# Patient Record
(demographics unavailable — no encounter records)

---

## 2025-02-04 NOTE — REVIEW OF SYSTEMS
[Fatigue] : no fatigue [Decreased Appetite] : appetite not decreased [Recent Weight Gain (___ Lbs)] : no recent weight gain [Recent Weight Loss (___ Lbs)] : no recent weight loss [Visual Field Defect] : no visual field defect [Dry Eyes] : no dryness [Dysphagia] : no dysphagia [Dysphonia] : no dysphonia [Chest Pain] : no chest pain [Palpitations] : no palpitations [Shortness Of Breath] : no shortness of breath [Cough] : no cough [Nausea] : no nausea [Constipation] : no constipation [Vomiting] : no vomiting [Diarrhea] : no diarrhea [Polyuria] : no polyuria [Irregular Menses] : regular menses [Joint Pain] : no joint pain [Muscle Weakness] : no muscle weakness [Acanthosis] : no acanthosis  [Acne] : no acne [Headaches] : no headaches [Tremors] : no tremors [Depression] : no depression [Polydipsia] : no polydipsia [Cold Intolerance] : no cold intolerance [Easy Bleeding] : no ~M tendency for easy bleeding [Easy Bruising] : no tendency for easy bruising

## 2025-02-04 NOTE — HISTORY OF PRESENT ILLNESS
[FreeTextEntry1] : 31 year old female here for evaluation of hypothyroidism.  She is accompanied by her    Most recent TFTs showing:  TSH: 0.656 FT4: 1.29  However, reported that earlier this year her TSH was <0.01  Reported history of thyroid nodules, no previous FNA   Symptoms:  +Increased cold intolerance  Increased hair loss  Increased constipation  She does have Plans for pregnancy   She reported that she has been having increased abdominal pain.

## 2025-02-04 NOTE — REVIEW OF SYSTEMS
[Fatigue] : no fatigue [Decreased Appetite] : appetite not decreased [Recent Weight Gain (___ Lbs)] : no recent weight gain [Recent Weight Loss (___ Lbs)] : no recent weight loss [Visual Field Defect] : no visual field defect [Dry Eyes] : no dryness [Dysphagia] : no dysphagia [Dysphonia] : no dysphonia [Chest Pain] : no chest pain [Palpitations] : no palpitations [Shortness Of Breath] : no shortness of breath [Nausea] : no nausea [Cough] : no cough [Constipation] : no constipation [Vomiting] : no vomiting [Diarrhea] : no diarrhea [Polyuria] : no polyuria [Irregular Menses] : regular menses [Joint Pain] : no joint pain [Muscle Weakness] : no muscle weakness [Acanthosis] : no acanthosis  [Acne] : no acne [Headaches] : no headaches [Tremors] : no tremors [Depression] : no depression [Polydipsia] : no polydipsia [Cold Intolerance] : no cold intolerance [Easy Bleeding] : no ~M tendency for easy bleeding [Easy Bruising] : no tendency for easy bruising

## 2025-02-04 NOTE — ASSESSMENT
[FreeTextEntry1] : 31 year old female here for evaluation of abnormal TFTs.  At this time will check full thyroid function, last labs were within normal limits.  Will order Thyroid US 2/2 history of thyroid nodules.   -Will be called back with results  -Follow up based on results

## 2025-02-04 NOTE — PHYSICAL EXAM
[Alert] : alert [Well Nourished] : well nourished [No Acute Distress] : no acute distress [Normal Sclera/Conjunctiva] : normal sclera/conjunctiva [PERRL] : pupils equal, round and reactive to light [Normal Outer Ear/Nose] : the ears and nose were normal in appearance [Normal TMs] : both tympanic membranes were normal [No Neck Mass] : no neck mass was observed [Thyroid Not Enlarged] : the thyroid was not enlarged [No Respiratory Distress] : no respiratory distress [Clear to Auscultation] : lungs were clear to auscultation bilaterally [Normal S1, S2] : normal S1 and S2 [Normal Rate] : heart rate was normal [Regular Rhythm] : with a regular rhythm [Normal Bowel Sounds] : normal bowel sounds [Not Tender] : non-tender [Soft] : abdomen soft [Normal Gait] : normal gait [No Clubbing, Cyanosis] : no clubbing  or cyanosis of the fingernails [No Joint Swelling] : no joint swelling seen [No Rash] : no rash [No Skin Lesions] : no skin lesions [No Motor Deficits] : the motor exam was normal [No Tremors] : no tremors [Oriented x3] : oriented to person, place, and time [Normal Affect] : the affect was normal [Normal Insight/Judgement] : insight and judgment were intact